# Patient Record
Sex: FEMALE | Race: WHITE | NOT HISPANIC OR LATINO | Employment: FULL TIME | URBAN - METROPOLITAN AREA
[De-identification: names, ages, dates, MRNs, and addresses within clinical notes are randomized per-mention and may not be internally consistent; named-entity substitution may affect disease eponyms.]

---

## 2017-02-14 ENCOUNTER — OFFICE VISIT - HEALTHEAST (OUTPATIENT)
Dept: MIDWIFE SERVICES | Facility: CLINIC | Age: 32
End: 2017-02-14

## 2017-02-14 ENCOUNTER — HOSPITAL ENCOUNTER (OUTPATIENT)
Dept: ULTRASOUND IMAGING | Facility: CLINIC | Age: 32
Discharge: HOME | End: 2017-02-14
Attending: ADVANCED PRACTICE MIDWIFE

## 2017-02-14 DIAGNOSIS — O20.9 VAGINAL BLEEDING IN PREGNANCY, FIRST TRIMESTER: ICD-10-CM

## 2017-02-14 DIAGNOSIS — Z32.00 POSSIBLE PREGNANCY: ICD-10-CM

## 2017-02-14 DIAGNOSIS — F41.9 ANXIETY: ICD-10-CM

## 2017-02-14 DIAGNOSIS — O46.91 ANTEPARTUM HEMORRHAGE, UNSPECIFIED, FIRST TRIMESTER (CODE): ICD-10-CM

## 2017-02-14 ASSESSMENT — MIFFLIN-ST. JEOR: SCORE: 1419.57

## 2017-03-06 ENCOUNTER — PRENATAL OFFICE VISIT - HEALTHEAST (OUTPATIENT)
Dept: MIDWIFE SERVICES | Facility: CLINIC | Age: 32
End: 2017-03-06

## 2017-03-06 DIAGNOSIS — F41.9 ANXIETY: ICD-10-CM

## 2017-03-06 DIAGNOSIS — Z34.81 ENCOUNTER FOR SUPERVISION OF OTHER NORMAL PREGNANCY IN FIRST TRIMESTER: ICD-10-CM

## 2017-03-06 DIAGNOSIS — Z34.81 ENCOUNTER FOR SUPERVISION OF OTHER NORMAL PREGNANCY, FIRST TRIMESTER: ICD-10-CM

## 2017-03-06 LAB — HIV 1+2 AB+HIV1 P24 AG SERPL QL IA: NEGATIVE

## 2017-03-06 ASSESSMENT — MIFFLIN-ST. JEOR: SCORE: 1405.96

## 2017-03-07 LAB
HBV SURFACE AG SERPL QL IA: NEGATIVE
SYPHILIS RPR SCREEN - HISTORICAL: NORMAL

## 2017-03-09 LAB
HPV INTERPRETATION - HISTORICAL: NORMAL
HPV INTERPRETER - HISTORICAL: NORMAL

## 2017-03-13 LAB
BKR LAB AP ABNORMAL BLEEDING: NO
BKR LAB AP BIRTH CONTROL/HORMONES: NORMAL
BKR LAB AP CERVICAL APPEARANCE: NORMAL
BKR LAB AP GYN ADEQUACY: NORMAL
BKR LAB AP GYN INTERPRETATION: NORMAL
BKR LAB AP HPV REFLEX: NORMAL
BKR LAB AP LMP: NORMAL
BKR LAB AP PATIENT STATUS: NORMAL
BKR LAB AP PREVIOUS ABNORMAL: NORMAL
BKR LAB AP PREVIOUS NORMAL: 2013
HIGH RISK?: NO
PATH REPORT.COMMENTS IMP SPEC: NORMAL
RESULT FLAG (HE HISTORICAL CONVERSION): NORMAL

## 2017-04-03 ENCOUNTER — PRENATAL OFFICE VISIT - HEALTHEAST (OUTPATIENT)
Dept: MIDWIFE SERVICES | Facility: CLINIC | Age: 32
End: 2017-04-03

## 2017-04-03 DIAGNOSIS — Z34.82 ENCOUNTER FOR SUPERVISION OF OTHER NORMAL PREGNANCY, SECOND TRIMESTER: ICD-10-CM

## 2017-04-03 ASSESSMENT — MIFFLIN-ST. JEOR: SCORE: 1415.03

## 2017-05-16 ENCOUNTER — HOSPITAL ENCOUNTER (OUTPATIENT)
Dept: ULTRASOUND IMAGING | Facility: CLINIC | Age: 32
Discharge: HOME | End: 2017-05-16
Attending: MIDWIFE

## 2017-05-16 ENCOUNTER — PRENATAL OFFICE VISIT - HEALTHEAST (OUTPATIENT)
Dept: MIDWIFE SERVICES | Facility: CLINIC | Age: 32
End: 2017-05-16

## 2017-05-16 ENCOUNTER — AMBULATORY - HEALTHEAST (OUTPATIENT)
Dept: MIDWIFE SERVICES | Facility: CLINIC | Age: 32
End: 2017-05-16

## 2017-05-16 DIAGNOSIS — Z34.82 ENCOUNTER FOR SUPERVISION OF OTHER NORMAL PREGNANCY, SECOND TRIMESTER: ICD-10-CM

## 2017-05-16 ASSESSMENT — MIFFLIN-ST. JEOR: SCORE: 1457.67

## 2017-05-22 ENCOUNTER — HOSPITAL ENCOUNTER (OUTPATIENT)
Dept: ULTRASOUND IMAGING | Facility: CLINIC | Age: 32
Discharge: HOME | End: 2017-05-22
Attending: ADVANCED PRACTICE MIDWIFE

## 2017-05-22 DIAGNOSIS — Z34.82 ENCOUNTER FOR SUPERVISION OF OTHER NORMAL PREGNANCY, SECOND TRIMESTER: ICD-10-CM

## 2017-05-24 ENCOUNTER — AMBULATORY - HEALTHEAST (OUTPATIENT)
Dept: OBGYN | Facility: CLINIC | Age: 32
End: 2017-05-24

## 2017-06-12 ENCOUNTER — PRENATAL OFFICE VISIT - HEALTHEAST (OUTPATIENT)
Dept: MIDWIFE SERVICES | Facility: CLINIC | Age: 32
End: 2017-06-12

## 2017-06-12 DIAGNOSIS — Z34.82 ENCOUNTER FOR SUPERVISION OF OTHER NORMAL PREGNANCY, SECOND TRIMESTER: ICD-10-CM

## 2017-06-12 ASSESSMENT — MIFFLIN-ST. JEOR: SCORE: 1490.78

## 2017-07-10 ENCOUNTER — PRENATAL OFFICE VISIT - HEALTHEAST (OUTPATIENT)
Dept: MIDWIFE SERVICES | Facility: CLINIC | Age: 32
End: 2017-07-10

## 2017-07-10 DIAGNOSIS — Z34.83 ENCOUNTER FOR SUPERVISION OF OTHER NORMAL PREGNANCY, THIRD TRIMESTER: ICD-10-CM

## 2017-07-10 ASSESSMENT — MIFFLIN-ST. JEOR: SCORE: 1528.43

## 2017-07-11 ENCOUNTER — COMMUNICATION - HEALTHEAST (OUTPATIENT)
Dept: MIDWIFE SERVICES | Facility: CLINIC | Age: 32
End: 2017-07-11

## 2017-07-11 LAB — SYPHILIS RPR SCREEN - HISTORICAL: NORMAL

## 2017-08-07 ENCOUNTER — PRENATAL OFFICE VISIT - HEALTHEAST (OUTPATIENT)
Dept: MIDWIFE SERVICES | Facility: CLINIC | Age: 32
End: 2017-08-07

## 2017-08-07 DIAGNOSIS — Z34.83 ENCOUNTER FOR SUPERVISION OF OTHER NORMAL PREGNANCY, THIRD TRIMESTER: ICD-10-CM

## 2017-08-07 ASSESSMENT — MIFFLIN-ST. JEOR: SCORE: 1555.64

## 2017-08-07 NOTE — ASSESSMENT & PLAN NOTE
Clinic/Hospital: [facility]  Partner Name: Oswaldo  Ultrasound predicts sex:female  Childrens names:   Previous labor experiences:  Level of education: college graduate  Pertinent History:   PP contraception: vasectomy  Hx PPD/Depression/Anxiety: anxiety  [facility] provider: [facility]  Plans for labor pain management:   Feeding preference: Breast  Breast pump:

## 2017-08-21 ENCOUNTER — PRENATAL OFFICE VISIT - HEALTHEAST (OUTPATIENT)
Dept: MIDWIFE SERVICES | Facility: CLINIC | Age: 32
End: 2017-08-21

## 2017-08-21 DIAGNOSIS — Z34.83 ENCOUNTER FOR SUPERVISION OF OTHER NORMAL PREGNANCY, THIRD TRIMESTER: ICD-10-CM

## 2017-08-21 ASSESSMENT — MIFFLIN-ST. JEOR: SCORE: 1578.78

## 2017-09-05 ENCOUNTER — PRENATAL OFFICE VISIT - HEALTHEAST (OUTPATIENT)
Dept: MIDWIFE SERVICES | Facility: CLINIC | Age: 32
End: 2017-09-05

## 2017-09-05 DIAGNOSIS — Z34.83 ENCOUNTER FOR SUPERVISION OF OTHER NORMAL PREGNANCY, THIRD TRIMESTER: ICD-10-CM

## 2017-09-05 ASSESSMENT — MIFFLIN-ST. JEOR: SCORE: 1591.93

## 2017-09-11 ENCOUNTER — PRENATAL OFFICE VISIT - HEALTHEAST (OUTPATIENT)
Dept: MIDWIFE SERVICES | Facility: CLINIC | Age: 32
End: 2017-09-11

## 2017-09-11 DIAGNOSIS — Z34.83 ENCOUNTER FOR SUPERVISION OF OTHER NORMAL PREGNANCY, THIRD TRIMESTER: ICD-10-CM

## 2017-09-11 ASSESSMENT — MIFFLIN-ST. JEOR: SCORE: 1601

## 2017-09-18 ENCOUNTER — PRENATAL OFFICE VISIT - HEALTHEAST (OUTPATIENT)
Dept: MIDWIFE SERVICES | Facility: CLINIC | Age: 32
End: 2017-09-18

## 2017-09-18 DIAGNOSIS — Z34.83 ENCOUNTER FOR SUPERVISION OF OTHER NORMAL PREGNANCY, THIRD TRIMESTER: ICD-10-CM

## 2017-09-18 ASSESSMENT — MIFFLIN-ST. JEOR: SCORE: 1591.93

## 2017-09-26 ENCOUNTER — COMMUNICATION - HEALTHEAST (OUTPATIENT)
Dept: OBGYN | Facility: CLINIC | Age: 32
End: 2017-09-26

## 2017-09-26 ENCOUNTER — PRENATAL OFFICE VISIT - HEALTHEAST (OUTPATIENT)
Dept: MIDWIFE SERVICES | Facility: CLINIC | Age: 32
End: 2017-09-26

## 2017-09-26 DIAGNOSIS — Z34.83 ENCOUNTER FOR SUPERVISION OF OTHER NORMAL PREGNANCY, THIRD TRIMESTER: ICD-10-CM

## 2017-09-26 ASSESSMENT — MIFFLIN-ST. JEOR: SCORE: 1602.15

## 2017-10-01 ENCOUNTER — ANESTHESIA - HEALTHEAST (OUTPATIENT)
Dept: OBGYN | Facility: HOSPITAL | Age: 32
End: 2017-10-01

## 2017-10-01 ENCOUNTER — COMMUNICATION - HEALTHEAST (OUTPATIENT)
Dept: OBGYN | Facility: CLINIC | Age: 32
End: 2017-10-01

## 2017-10-03 ENCOUNTER — HOME CARE/HOSPICE - HEALTHEAST (OUTPATIENT)
Dept: HOME HEALTH SERVICES | Facility: HOME HEALTH | Age: 32
End: 2017-10-03

## 2017-10-05 ENCOUNTER — HOME CARE/HOSPICE - HEALTHEAST (OUTPATIENT)
Dept: HOME HEALTH SERVICES | Facility: HOME HEALTH | Age: 32
End: 2017-10-05

## 2017-11-13 ENCOUNTER — OFFICE VISIT - HEALTHEAST (OUTPATIENT)
Dept: MIDWIFE SERVICES | Facility: CLINIC | Age: 32
End: 2017-11-13

## 2017-11-13 ASSESSMENT — MIFFLIN-ST. JEOR: SCORE: 1543.18

## 2018-12-19 ENCOUNTER — RECORDS - HEALTHEAST (OUTPATIENT)
Dept: LAB | Facility: CLINIC | Age: 33
End: 2018-12-19

## 2018-12-19 LAB — TSH SERPL DL<=0.005 MIU/L-ACNC: 1.7 UIU/ML (ref 0.3–5)

## 2019-12-06 ENCOUNTER — RECORDS - HEALTHEAST (OUTPATIENT)
Dept: LAB | Facility: CLINIC | Age: 34
End: 2019-12-06

## 2019-12-06 LAB
ALBUMIN SERPL-MCNC: 4.3 G/DL (ref 3.5–5)
ALP SERPL-CCNC: 49 U/L (ref 45–120)
ALT SERPL W P-5'-P-CCNC: <9 U/L (ref 0–45)
ANION GAP SERPL CALCULATED.3IONS-SCNC: 11 MMOL/L (ref 5–18)
AST SERPL W P-5'-P-CCNC: 13 U/L (ref 0–40)
BILIRUB SERPL-MCNC: 0.3 MG/DL (ref 0–1)
BUN SERPL-MCNC: 17 MG/DL (ref 8–22)
CALCIUM SERPL-MCNC: 9.5 MG/DL (ref 8.5–10.5)
CHLORIDE BLD-SCNC: 106 MMOL/L (ref 98–107)
CO2 SERPL-SCNC: 24 MMOL/L (ref 22–31)
CREAT SERPL-MCNC: 0.8 MG/DL (ref 0.6–1.1)
GFR SERPL CREATININE-BSD FRML MDRD: >60 ML/MIN/1.73M2
GLUCOSE BLD-MCNC: 94 MG/DL (ref 70–125)
POTASSIUM BLD-SCNC: 4.3 MMOL/L (ref 3.5–5)
PROT SERPL-MCNC: 7.1 G/DL (ref 6–8)
SODIUM SERPL-SCNC: 141 MMOL/L (ref 136–145)
TSH SERPL DL<=0.005 MIU/L-ACNC: 1.94 UIU/ML (ref 0.3–5)

## 2020-09-22 ENCOUNTER — RECORDS - HEALTHEAST (OUTPATIENT)
Dept: LAB | Facility: CLINIC | Age: 35
End: 2020-09-22

## 2020-09-23 LAB — BACTERIA SPEC CULT: NO GROWTH

## 2021-05-30 ENCOUNTER — RECORDS - HEALTHEAST (OUTPATIENT)
Dept: ADMINISTRATIVE | Facility: CLINIC | Age: 36
End: 2021-05-30

## 2021-05-30 VITALS — BODY MASS INDEX: 25.55 KG/M2 | WEIGHT: 159 LBS | HEIGHT: 66 IN

## 2021-05-30 VITALS — WEIGHT: 157 LBS | BODY MASS INDEX: 25.23 KG/M2 | HEIGHT: 66 IN

## 2021-05-30 VITALS — HEIGHT: 66 IN | BODY MASS INDEX: 25.71 KG/M2 | WEIGHT: 160 LBS

## 2021-05-31 VITALS — BODY MASS INDEX: 30.53 KG/M2 | WEIGHT: 190 LBS | HEIGHT: 66 IN

## 2021-05-31 VITALS — HEIGHT: 65 IN | BODY MASS INDEX: 31.49 KG/M2 | WEIGHT: 189 LBS

## 2021-05-31 VITALS — WEIGHT: 195.1 LBS | BODY MASS INDEX: 31.36 KG/M2 | HEIGHT: 66 IN

## 2021-05-31 VITALS — WEIGHT: 175.7 LBS | BODY MASS INDEX: 28.24 KG/M2 | HEIGHT: 66 IN

## 2021-05-31 VITALS — BODY MASS INDEX: 32.14 KG/M2 | WEIGHT: 200 LBS | HEIGHT: 66 IN

## 2021-05-31 VITALS — HEIGHT: 66 IN | WEIGHT: 184 LBS | BODY MASS INDEX: 29.57 KG/M2

## 2021-05-31 VITALS — WEIGHT: 198 LBS | BODY MASS INDEX: 31.82 KG/M2 | HEIGHT: 66 IN

## 2021-05-31 VITALS — HEIGHT: 66 IN | BODY MASS INDEX: 31.82 KG/M2 | WEIGHT: 198 LBS

## 2021-05-31 VITALS — HEIGHT: 65 IN | BODY MASS INDEX: 33.66 KG/M2 | WEIGHT: 202 LBS

## 2021-05-31 VITALS — HEIGHT: 66 IN | WEIGHT: 168.4 LBS | BODY MASS INDEX: 27.06 KG/M2

## 2021-06-08 NOTE — PROGRESS NOTES
"  Assessment:     , LMP 2016     Plan:   1. Discussed working Estimated Date of Delivery. Spotting on 2017. Will get US to confirm viability and dating. Referral given.   2. Oriented to [facility] CNM care; group and practice info reviewed. Takes Sertraline 50 mg PO QD for anxiety: has white coat syndrome.   3. 1st trimester teaching: encouraged well-balanced diet, pre- vitamins, vitamin D3 and omega 3 supplements, daily and walking for exercise. Discussed warning signs and when to call.   4. HML ABO/RH typing today.  5. She will schedule her initial OB visit in 3-4 weeks.   6. Does not want fetal screening or testing beyond the mid-pregnancy fetal USN scan.     Total time spent with patient 20 minutes, >50% counseling, education and coordination of care.   Subjective:     Christine is a 31 y.o. y.o.  who presents for pregnancy confirmation. Works FT in marketing. Has a 2.4 y/o boy, Estuardo. Expected pregnancy; became pregnant after three months of being open to conception.  Contraception: none; prior ParaGard, removed 2016.      Current symptoms also include: fatigued; breast tenderness; nausea and vomiting (1-4X/day)--advised at length.     Patient's last menstrual period was 2016.. She is certain of this date. Menstrual cycles are relatively regular, occuring every 28-32 days.  Last period was normal.      Review of Systems  Denies bleeding, pain or cramping, abnormal vaginal discharge or dysuria.  Objective:        Visit Vitals     /74 (Patient Site: Left Arm, Patient Position: Sitting, Cuff Size: Adult Regular)     Pulse 88     Resp 16     Ht 5' 5.5\" (1.664 m)     Wt 160 lb (72.6 kg)     LMP 2016     Breastfeeding No     BMI 26.22 kg/m2      General: alert and no acute distress      Lab Review  Urine HCG: positive    "

## 2021-06-09 NOTE — PROGRESS NOTES
Christine is here today for routine prenatal visit. Feeling better for the last few weeks, happy that she has gained 2 # back after being sick and losing weight. Denies feeling quickening yet. Plan for screening ultrasound at 20 weeks. Referral placed on chart. Declines quad screening.

## 2021-06-09 NOTE — PROGRESS NOTES
PRENATAL VISIT   FIRST OBSTETRICAL EXAM - OB    Assessment / Impression     First prenatal visit at 10w1d      Plan:     -IOB labs drawn.  -Pt is not interested in drawing lead level.  -Patient is not interested in waterbirth.   -Reviewed prenatal care schedule.  -Optimal nutrition and weight gain discussed. Pregnancy weight gain of 25-35 lbs (BMI 18.5-24.9) encouraged.   -Anticipatory guidance for common pregnancy questions and concerns reviewed.   -Danger s/sx for this trimester reviewed with patient.  -Reviewed genetic screening options with patient, patient does not elects for first trimester screening.  -IOB packet given and reviewed with patient.  -CNM services and hospital options reviewed; emergency and scheduling phone numbers given to patient.  -Return to clinic 4 weeks    Total time spent with patient: 40 minutes, >50% time spent counseling and coordinating care.    Subjective:    Christine Stokes is a 31 y.o.  here today for her First Obstetrical Exam.      Education level: college graduate   Occupation: marketing  Partners name: [employer] occupation: [employer]    OB History    Para Term  AB SAB TAB Ectopic Multiple Living   2 1 1       1      # Outcome Date GA Lbr Raymundo/2nd Weight Sex Delivery Anes PTL Lv   2 Current            1 Term 14 39w1d 02:53 / 00:51 7 lb 12 oz (3.515 kg) M Vag-Spont EPI,Local N Y          Expected Date of Delivery: 10/1/2017, by Last Menstrual Period    No past medical history on file.  No past surgical history on file.  Social History   Substance Use Topics     Smoking status: Never Smoker     Smokeless tobacco: None     Alcohol use No     Current Outpatient Prescriptions   Medication Sig Dispense Refill     sertraline (ZOLOFT) 50 MG tablet Take 50 mg by mouth daily.       No current facility-administered medications for this visit.      Allergies   Allergen Reactions     Ciprofloxacin              Pregnancy Risk Factors: Every been  "treated for an emotional disturbance    EPDS score today: 4    Review of Systems  General:  Denies problem  Eyes: Denies problem  Ears/Nose/Throat: Denies problem  Cardiovascular: Denies problem  Respiratory:  Denies problem  Gastrointestinal: nausea and vomiting, getting less frequent  Genitourinary: Denies problem  Musculoskeletal:  Breast tenderness  Skin: Denies problem  Neurologic: Denies problem  Psychiatric: Denies problem  Endocrine: Denies problem  Heme/Lymphatic: Denies problem   Allergic/Immunologic: Denies problem       Objective:   Objective    Vitals:    03/06/17 0756   BP: 122/64   Pulse: 64   Weight: 157 lb (71.2 kg)   Height: 5' 5.5\" (1.664 m)     Physical Exam:  General Appearance: Alert, cooperative, no distress, appears stated age  Head: Normocephalic, without obvious abnormality, atraumatic  Eyes: Conjunctiva/corneas clear, does not wear corrective lenses  Neck: Supple, symmetrical, trachea midline, no adenopathy  Thyroid: not enlarged, symmetric, no tenderness/mass/nodules  Back: Symmetric, no curvature, ROM normal, no CVA tenderness  Lungs: Clear to auscultation bilaterally, respirations unlabored  Heart: Regular rate and rhythm, S1 and S2 normal, no murmur, rub, or gallop  Breasts: No breast masses, tenderness, asymmetry, or nipple discharge. Nipples are  everted.  Abdomen: Soft, non-tender, bowel sounds active all four quadrants, no masses.   FHT: 174   Vulva:  no sign of lesions or condyloma, normal hair distribution  Vagina: pink, normal rugae, no abnormal discharge  Cervix:  long/thick/closed, no lesions or inflammation noted, negative CMT with exam  Uterus: mid position, non tender, enlarged approximately 10 weeks size  Adnexa:  no masses appreciated, non-tender with palpation  Pelvic spines:AVERAGE  Sacrum:CONCAVE  Suprapubic Arch:NORMAL  Pelvis type:gynecoid            Extremities: Extremities normal, atraumatic, no cyanosis or edema  Skin: Skin color, texture, turgor normal, no rashes " or lesions  Lymph nodes: Cervical, supraclavicular, and axillary nodes normal  Neurologic: Alert and oriented x 3.    Lab:   Results for orders placed or performed in visit on 02/14/17   Pregnancy, Urine   Result Value Ref Range    Pregnancy Test, Urine Positive    HML ABO/Rh Typing (Type and Screen for outpatient)   Result Value Ref Range    HML ABO/Rh Typing O POS     HML ABO/Rh Repeat Typing O POS

## 2021-06-10 NOTE — PROGRESS NOTES
"Christine Stokes is here for JULIO at 20w2d. \"Feeling really good\". Hungrier than normal. Has been exercising. Weight gain improved. Feeling movement now since late last week. Had fetal survey this morning: having a girl!. Reviewed report with patient: normal growth, posterior placenta, normal fluid, small 1 cm fibroid. Normal anatomy except heart was not visualized well; f/u US ordered for 1-2 weeks. RTC 4-5 weeks.  "

## 2021-06-11 NOTE — PROGRESS NOTES
Christine is here today by herself for her routine prenatal visit. Reviewed S/S of PTL, pt has some irreg contraction but reports that they are better with rest and emptying bladder.  One hour glucose, Hgb, RPR drawn today. Tdap given. Reviewed peds provider, plan to breast feed and enc to pre register at hospital after 30 weeks.

## 2021-06-12 NOTE — PROGRESS NOTES
Christine is here today for routine prenatal visit at 32 weeks, no concerns. Reviewed lab results, discussed labor and signs and symptoms of PTL. Encouraged to pre register at hospital. Talking with  about permanent birth control, possibly vasectomy.

## 2021-06-12 NOTE — PROGRESS NOTES
Christine Stokes is here for JULIO 36w2d. Here alone. Feeling well overall. Denies concerns today. Active FM. Feels like baby may have dropped a bit. Measuring 34 cm today, may be due to baby being lower in pelvis. Advised we would watch FH at next visit. Vertex/ROT per Leopolds and VE. GBS and Hgb collected today. RTC 1 week.

## 2021-06-12 NOTE — PROGRESS NOTES
Christine is here today for her routine prenatal exam at 34 weeks. EPDS score=1. Stable on Sertraline 50 mg po daily for anxiety. 5# weight gain in last two weeks, has been on vacation and thinks change in diet may have something to do with it, also feels a bit more swollen in hands and feet. Baby active. Unsure of presentation today. Discussed confirming presentation next visit and possible ultrasound to confirm if necessary. IUD postpartum for BC until  decides about vasectomy.

## 2021-06-12 NOTE — PROGRESS NOTES
Christine Stokes is here by herself for a routine prenatal visit at 37w1d.  She has no concerns and is feeling well.    Feeling a little tired but doing okay  Fetal movement is normal.   Interval weight gain is approriate.   Group B strep was negative.  Encouraged to schedule weekly visits from to/through her EDB.  Anticipatory guidance, signs of symptoms of labor or SROM, third trimester warning signs, when to call and CNM contact information reviewed.   RTC 1 week    LISSETTE Gomes CNM

## 2021-06-13 NOTE — ANESTHESIA PREPROCEDURE EVALUATION
Anesthesia Evaluation      Patient summary reviewed   No history of anesthetic complications     Airway   Mallampati: II  Neck ROM: full   Pulmonary - negative ROS and normal exam                          Cardiovascular - normal exam  Exercise tolerance: > or = 4 METS   Neuro/Psych - negative ROS     Endo/Other    (+) pregnant     GI/Hepatic/Renal - negative ROS           Dental - normal exam                        Anesthesia Plan  Planned anesthetic: epidural    ASA 2     Anesthetic plan and risks discussed with: patient    Post-op plan: routine recovery

## 2021-06-13 NOTE — ANESTHESIA POSTPROCEDURE EVALUATION
Anesthesiology Labor Epidural Rounds    Patient doing well.  Denies headache.  Denies back pain.  Patient able to move all extremities.  Vital signs stable.  Site clean, dry and intact.    Patient satisfied with labor analgesia.  No complications.      Yasir Blankenship M.D.  Department of Anesthesiology

## 2021-06-13 NOTE — PROGRESS NOTES
Christine is here for routine PNV.  Doing well - active baby.  Notices increased vaginal discharge 1x/day, but no LOF or gushes.    Denies ctx.    A little nervous about spontaneous labor since she was induced with her last baby.   Desires VE:  Internal os closed, external os 2cm/50/-2/med/posterior and to maternal right.    Tchg:  FKC, s/sx of labor, CNM number and when to call

## 2021-06-13 NOTE — ANESTHESIA PROCEDURE NOTES
Epidural Block    Patient location during procedure: OB  Time Called: 10/1/2017 10:07 AM  Reason for Block:labor epidural  Staffing:  Performing  Anesthesiologist: NANA HILTON  Preanesthetic Checklist  Completed: patient identified, risks, benefits, and alternatives discussed, timeout performed, consent obtained, airway assessed, oxygen available, suction available, emergency drugs available and hand hygiene performed  Procedure  Patient position: right lateral decubitus  Prep: ChloraPrep  Patient monitoring: continuous pulse oximetry, heart rate and blood pressure  Approach: midline  Location: L3-L4  Epidural technique: DEE to local.  Number of Attempts:4  Needle  Needle type: Gumaro   Needle gauge: 18 G     Catheter in Space: 4  Sensory level: not assessed at this time.     Additional Notes:  Hot bone multiple times.

## 2021-06-14 NOTE — PROGRESS NOTES
"  Assessment:        Christine Stokes is a 32 y.o. female here for postpartum exam at 6 weeks postpartum. Pap smear not done at today's visit.     Plan:        1. Normal postpartum  2. Contraception: IUD plans to return to clinic for insertion  3. RTC for IUD insertion      Subjective:       Christine Stokes is a 32 y.o. female who presents for a postpartum visit. She is 6 weeks postpartum following a spontaneous vaginal delivery. I have fully reviewed the prenatal and intrapartum course. The delivery was at 40 gestational weeks. Outcome: spontaneous vaginal delivery. Postpartum course has been uncomplicated. Baby's course has been uncomplicated. Baby is feeding breast for the first 5 week now being fed by bottle - Similac Advance. Bled for  5 weeks postpartum.  Currently bleeding  no bleeding. Bowel function is normal. Bladder function is normal. Patient has not resumed intercourse. Contraception method is IUD. Plans to return to clinic for insertion. Reviewed risks and benefits, has had Para sanya IUD previously and is comfortable with this method.  Postpartum depression screening score: 0     The following portions of the patient's history were reviewed and updated as appropriate: allergies, current medications, problem list and past family history    Review of Systems  General:  Denies problem  Eyes: Denies problem  Ears/Nose/Throat: Denies problem  Cardiovascular: Denies problem  Respiratory:  Denies problem  Gastrointestinal:  Denies problem, Genitourinary: Denies problem  Musculoskeletal:  Denies problem  Skin: Denies problem  Neurologic: Denies problem  Psychiatric: Denies problem  Endocrine: Denies problem  Heme/Lymphatic: Denies problem   Allergic/Immunologic: Denies problem          Objective:         Vitals:    11/13/17 0838   BP: 104/72   Pulse: 76   Weight: 189 lb (85.7 kg)   Height: 5' 5\" (1.651 m)       Physical Exam:  General Appearance: Alert, cooperative, no distress, appears stated age  Head: " Normocephalic, without obvious abnormality, atraumatic  Neck: Supple, symmetrical, trachea midline, no adenopathy;  thyroid: not enlarged, symmetric, no tenderness/mass/nodules; no carotid bruit or JVD  Back: Symmetric, no curvature, ROM normal, no CVA tenderness  Lungs: Clear to auscultation bilaterally, respirations unlabored  Breasts: No breast exam today, weaned from pumping and feeding breast milk via bottle last week changed to formula feeding.   Heart: Regular rate and rhythm, S1 and S2 normal, no murmur, rub, or gallop, Abdomen: Soft, non-tender, bowel sounds active all four quadrants,  no masses, no organomegaly  Pelvic:bimanual exam reveals external genitalia WNL, normal hair distribution, cervix, nontender, mobile, uterus is small nontender, mobile no adnexal masses left or right.  Extremities: Extremities normal, atraumatic, no cyanosis or edema  Skin: Skin color, texture, turgor normal, no rashes or lesions

## 2021-06-15 PROBLEM — Z34.80 ENCOUNTER FOR SUPERVISION OF OTHER NORMAL PREGNANCY: Status: ACTIVE | Noted: 2017-03-06

## 2021-06-15 PROBLEM — D25.9 UTERINE FIBROID: Status: ACTIVE | Noted: 2017-05-24

## 2021-06-15 PROBLEM — F41.9 ANXIETY: Status: ACTIVE | Noted: 2017-02-14

## 2021-07-14 PROBLEM — Z34.90 PREGNANT: Status: RESOLVED | Noted: 2017-10-01 | Resolved: 2017-10-02

## 2021-07-14 PROBLEM — O20.9 VAGINAL BLEEDING IN PREGNANCY, FIRST TRIMESTER: Status: RESOLVED | Noted: 2017-02-14 | Resolved: 2017-04-03

## 2021-08-22 ENCOUNTER — HEALTH MAINTENANCE LETTER (OUTPATIENT)
Age: 36
End: 2021-08-22

## 2021-10-17 ENCOUNTER — HEALTH MAINTENANCE LETTER (OUTPATIENT)
Age: 36
End: 2021-10-17

## 2022-04-06 ENCOUNTER — LAB REQUISITION (OUTPATIENT)
Dept: LAB | Facility: CLINIC | Age: 37
End: 2022-04-06

## 2022-04-06 DIAGNOSIS — Z83.49 FAMILY HISTORY OF OTHER ENDOCRINE, NUTRITIONAL AND METABOLIC DISEASES: ICD-10-CM

## 2022-04-06 DIAGNOSIS — N92.6 IRREGULAR MENSTRUATION, UNSPECIFIED: ICD-10-CM

## 2022-04-06 PROCEDURE — 83001 ASSAY OF GONADOTROPIN (FSH): CPT | Performed by: FAMILY MEDICINE

## 2022-04-06 PROCEDURE — 84443 ASSAY THYROID STIM HORMONE: CPT | Performed by: FAMILY MEDICINE

## 2022-04-07 LAB
FSH SERPL-ACNC: 7.2 MIU/ML
TSH SERPL DL<=0.005 MIU/L-ACNC: 1.73 UIU/ML (ref 0.3–5)

## 2022-04-29 NOTE — PROGRESS NOTES
Feeling well.  Active baby.  Is starting to notice some rare BH ctx, nothing regular.  Reviewed US findings.  Disc GCT at next visit.  Pt reports that she failed GCT during first pregnancy but passed GTT.   continue home atorvastatin, zetia

## 2022-10-02 ENCOUNTER — HEALTH MAINTENANCE LETTER (OUTPATIENT)
Age: 37
End: 2022-10-02

## 2023-07-02 ENCOUNTER — APPOINTMENT (OUTPATIENT)
Dept: CT IMAGING | Facility: HOSPITAL | Age: 38
End: 2023-07-02
Attending: STUDENT IN AN ORGANIZED HEALTH CARE EDUCATION/TRAINING PROGRAM
Payer: COMMERCIAL

## 2023-07-02 ENCOUNTER — HOSPITAL ENCOUNTER (EMERGENCY)
Facility: HOSPITAL | Age: 38
Discharge: HOME | End: 2023-07-02
Attending: STUDENT IN AN ORGANIZED HEALTH CARE EDUCATION/TRAINING PROGRAM | Admitting: STUDENT IN AN ORGANIZED HEALTH CARE EDUCATION/TRAINING PROGRAM
Payer: COMMERCIAL

## 2023-07-02 VITALS
TEMPERATURE: 98.1 F | WEIGHT: 143 LBS | SYSTOLIC BLOOD PRESSURE: 121 MMHG | HEART RATE: 68 BPM | BODY MASS INDEX: 22.98 KG/M2 | HEIGHT: 66 IN | DIASTOLIC BLOOD PRESSURE: 63 MMHG | RESPIRATION RATE: 18 BRPM | OXYGEN SATURATION: 100 %

## 2023-07-02 DIAGNOSIS — B27.90 INFECTIOUS MONONUCLEOSIS WITHOUT COMPLICATION, INFECTIOUS MONONUCLEOSIS DUE TO UNSPECIFIED ORGANISM: ICD-10-CM

## 2023-07-02 DIAGNOSIS — R00.2 PALPITATIONS: ICD-10-CM

## 2023-07-02 LAB
ALBUMIN SERPL BCG-MCNC: 4 G/DL (ref 3.5–5.2)
ALBUMIN UR-MCNC: 20 MG/DL
ALP SERPL-CCNC: 79 U/L (ref 35–104)
ALT SERPL W P-5'-P-CCNC: 26 U/L (ref 0–50)
ANION GAP SERPL CALCULATED.3IONS-SCNC: 13 MMOL/L (ref 7–15)
APPEARANCE UR: CLEAR
AST SERPL W P-5'-P-CCNC: 35 U/L (ref 0–45)
BASOPHILS # BLD AUTO: 0 10E3/UL (ref 0–0.2)
BASOPHILS NFR BLD AUTO: 1 %
BILIRUB SERPL-MCNC: 0.4 MG/DL
BILIRUB UR QL STRIP: NEGATIVE
BUN SERPL-MCNC: 19 MG/DL (ref 6–20)
CALCIUM SERPL-MCNC: 9.4 MG/DL (ref 8.6–10)
CHLORIDE SERPL-SCNC: 104 MMOL/L (ref 98–107)
COLOR UR AUTO: YELLOW
CREAT SERPL-MCNC: 0.8 MG/DL (ref 0.51–0.95)
D DIMER PPP FEU-MCNC: 2.56 UG/ML FEU (ref 0–0.5)
DEPRECATED HCO3 PLAS-SCNC: 23 MMOL/L (ref 22–29)
EOSINOPHIL # BLD AUTO: 0.1 10E3/UL (ref 0–0.7)
EOSINOPHIL NFR BLD AUTO: 2 %
ERYTHROCYTE [DISTWIDTH] IN BLOOD BY AUTOMATED COUNT: 13.1 % (ref 10–15)
FLUAV RNA SPEC QL NAA+PROBE: NEGATIVE
FLUBV RNA RESP QL NAA+PROBE: NEGATIVE
GFR SERPL CREATININE-BSD FRML MDRD: >90 ML/MIN/1.73M2
GLUCOSE SERPL-MCNC: 119 MG/DL (ref 70–99)
GLUCOSE UR STRIP-MCNC: NEGATIVE MG/DL
HCG SERPL QL: NEGATIVE
HCT VFR BLD AUTO: 38.6 % (ref 35–47)
HGB BLD-MCNC: 12.6 G/DL (ref 11.7–15.7)
HGB UR QL STRIP: ABNORMAL
IMM GRANULOCYTES # BLD: 0 10E3/UL
IMM GRANULOCYTES NFR BLD: 0 %
KETONES UR STRIP-MCNC: NEGATIVE MG/DL
LEUKOCYTE ESTERASE UR QL STRIP: NEGATIVE
LYMPHOCYTES # BLD AUTO: 1.2 10E3/UL (ref 0.8–5.3)
LYMPHOCYTES NFR BLD AUTO: 34 %
MCH RBC QN AUTO: 26.6 PG (ref 26.5–33)
MCHC RBC AUTO-ENTMCNC: 32.6 G/DL (ref 31.5–36.5)
MCV RBC AUTO: 82 FL (ref 78–100)
MONOCYTES # BLD AUTO: 0.3 10E3/UL (ref 0–1.3)
MONOCYTES NFR BLD AUTO: 8 %
MONOCYTES NFR BLD AUTO: POSITIVE %
MUCOUS THREADS #/AREA URNS LPF: PRESENT /LPF
NEUTROPHILS # BLD AUTO: 1.9 10E3/UL (ref 1.6–8.3)
NEUTROPHILS NFR BLD AUTO: 55 %
NITRATE UR QL: NEGATIVE
NRBC # BLD AUTO: 0 10E3/UL
NRBC BLD AUTO-RTO: 0 /100
PH UR STRIP: 5.5 [PH] (ref 5–7)
PLATELET # BLD AUTO: 162 10E3/UL (ref 150–450)
POTASSIUM SERPL-SCNC: 3.8 MMOL/L (ref 3.4–5.3)
PROT SERPL-MCNC: 7 G/DL (ref 6.4–8.3)
RBC # BLD AUTO: 4.73 10E6/UL (ref 3.8–5.2)
RBC URINE: 36 /HPF
RSV RNA SPEC NAA+PROBE: NEGATIVE
SARS-COV-2 RNA RESP QL NAA+PROBE: NEGATIVE
SODIUM SERPL-SCNC: 140 MMOL/L (ref 136–145)
SP GR UR STRIP: 1.03 (ref 1–1.03)
SQUAMOUS EPITHELIAL: <1 /HPF
TROPONIN T SERPL HS-MCNC: <6 NG/L
UROBILINOGEN UR STRIP-MCNC: <2 MG/DL
WBC # BLD AUTO: 3.4 10E3/UL (ref 4–11)
WBC URINE: 1 /HPF

## 2023-07-02 PROCEDURE — 36415 COLL VENOUS BLD VENIPUNCTURE: CPT | Performed by: STUDENT IN AN ORGANIZED HEALTH CARE EDUCATION/TRAINING PROGRAM

## 2023-07-02 PROCEDURE — 84484 ASSAY OF TROPONIN QUANT: CPT | Performed by: STUDENT IN AN ORGANIZED HEALTH CARE EDUCATION/TRAINING PROGRAM

## 2023-07-02 PROCEDURE — 93005 ELECTROCARDIOGRAM TRACING: CPT | Performed by: STUDENT IN AN ORGANIZED HEALTH CARE EDUCATION/TRAINING PROGRAM

## 2023-07-02 PROCEDURE — 86308 HETEROPHILE ANTIBODY SCREEN: CPT | Performed by: STUDENT IN AN ORGANIZED HEALTH CARE EDUCATION/TRAINING PROGRAM

## 2023-07-02 PROCEDURE — 81001 URINALYSIS AUTO W/SCOPE: CPT | Performed by: STUDENT IN AN ORGANIZED HEALTH CARE EDUCATION/TRAINING PROGRAM

## 2023-07-02 PROCEDURE — 250N000011 HC RX IP 250 OP 636: Mod: JZ | Performed by: STUDENT IN AN ORGANIZED HEALTH CARE EDUCATION/TRAINING PROGRAM

## 2023-07-02 PROCEDURE — 99285 EMERGENCY DEPT VISIT HI MDM: CPT | Mod: 25

## 2023-07-02 PROCEDURE — 96360 HYDRATION IV INFUSION INIT: CPT | Mod: 59

## 2023-07-02 PROCEDURE — 80053 COMPREHEN METABOLIC PANEL: CPT | Performed by: STUDENT IN AN ORGANIZED HEALTH CARE EDUCATION/TRAINING PROGRAM

## 2023-07-02 PROCEDURE — 258N000003 HC RX IP 258 OP 636: Performed by: STUDENT IN AN ORGANIZED HEALTH CARE EDUCATION/TRAINING PROGRAM

## 2023-07-02 PROCEDURE — 84703 CHORIONIC GONADOTROPIN ASSAY: CPT | Performed by: STUDENT IN AN ORGANIZED HEALTH CARE EDUCATION/TRAINING PROGRAM

## 2023-07-02 PROCEDURE — 85379 FIBRIN DEGRADATION QUANT: CPT | Performed by: STUDENT IN AN ORGANIZED HEALTH CARE EDUCATION/TRAINING PROGRAM

## 2023-07-02 PROCEDURE — 86618 LYME DISEASE ANTIBODY: CPT | Performed by: STUDENT IN AN ORGANIZED HEALTH CARE EDUCATION/TRAINING PROGRAM

## 2023-07-02 PROCEDURE — 86617 LYME DISEASE ANTIBODY: CPT | Performed by: STUDENT IN AN ORGANIZED HEALTH CARE EDUCATION/TRAINING PROGRAM

## 2023-07-02 PROCEDURE — 85025 COMPLETE CBC W/AUTO DIFF WBC: CPT | Performed by: STUDENT IN AN ORGANIZED HEALTH CARE EDUCATION/TRAINING PROGRAM

## 2023-07-02 PROCEDURE — 87637 SARSCOV2&INF A&B&RSV AMP PRB: CPT | Performed by: STUDENT IN AN ORGANIZED HEALTH CARE EDUCATION/TRAINING PROGRAM

## 2023-07-02 PROCEDURE — 71275 CT ANGIOGRAPHY CHEST: CPT

## 2023-07-02 RX ORDER — IOPAMIDOL 755 MG/ML
90 INJECTION, SOLUTION INTRAVASCULAR ONCE
Status: COMPLETED | OUTPATIENT
Start: 2023-07-02 | End: 2023-07-02

## 2023-07-02 RX ADMIN — IOPAMIDOL 90 ML: 755 INJECTION, SOLUTION INTRAVENOUS at 17:42

## 2023-07-02 RX ADMIN — SODIUM CHLORIDE 1000 ML: 9 INJECTION, SOLUTION INTRAVENOUS at 16:53

## 2023-07-02 ASSESSMENT — ACTIVITIES OF DAILY LIVING (ADL): ADLS_ACUITY_SCORE: 35

## 2023-07-02 NOTE — ED PROVIDER NOTES
NAME: Christine Stokes  AGE: 38 year old female  YOB: 1985  MRN: [id number]  EVALUATION DATE & TIME: 2023  4:08 PM    PCP: Lyndsay Nesbitt  ED PROVIDER: Lyndsay Alvarez MD.    Chief Complaint   Patient presents with     Palpitations       FINAL IMPRESSION:  1. Palpitations    2. Infectious mononucleosis without complication, infectious mononucleosis due to unspecified organism        MEDICAL DECISION MAKIN:24 PM I met with the patient, obtained history, performed an initial exam, and discussed options and plan for diagnostics and treatment here in the ED.  6:31 PM I spoke to patient regarding the plan for discharge. They are agreeable and comfortable with plan.    38-year-old female presents with palpitations.  Patient's been feeling unwell for the last 10 days or so.  She initially had fevers that have since improved.  But she has had increased fatigue and felt like she has had some right-sided neck lymphadenopathy.  Today at the [employer] [place] game she developed some palpitations and left-sided chest pressure.  She is afebrile here with generally reassuring vitals, do not suspect sepsis. No AMS or nuchal rigidity, do not suspect meningitis. Her EKG shows sinus rhythm without ST elevation.  High-sensitivity troponin <6, low heart score, do not suspect ACS or feel needs delta troponin. D-dimer was elevated to proceeded with CT PE scan, no PE seen. Nature of pain not suggestive of pericarditis or dissection. UA with blood (currently menstruating) but no signs of infection. No sore throat and reassuring throat exam.  I do suspect her symptoms could be from mono. Radiologist did note mild splenomegaly on her CT scan that likely from mono.  She did feel improved after fluids and heart rate decreased to 68.  I recommended continual symptomatic management at home and close follow-up with her primary care doctor.  Strict return precautions discussed and her questions were answered.  Of note she had  "requested a Lyme's test which was pending at the time of discharge.    Medical Decision Making    History:    Supplemental history from: N/A    External Record(s) reviewed: Documented in chart, if applicable.    Work Up:    Chart documentation includes differential considered and any EKGs or imaging interpreted by provider.    In additional to work up documented, I considered the following work up: Documented in chart, if applicable.    External consultation:    Discussion of management with another provider: Documented in chart, if applicable    Complicating factors:    Care impacted by chronic illness: Mental Health    Care affected by social determinants of health: Access to Medical Care    Disposition considerations: Discharge. No recommendations on prescription strength medication(s). See documentation for any additional details.      MEDICATIONS GIVEN IN THE EMERGENCY:  Medications   0.9% sodium chloride BOLUS (0 mLs Intravenous Stopped 7/2/23 1822)   iopamidol (ISOVUE-370) solution 90 mL (90 mLs Intravenous $Given 7/2/23 1742)       NEW PRESCRIPTIONS STARTED AT TODAY'S ER VISIT:  Discharge Medication List as of 7/2/2023  6:46 PM          =================================================================  HPI    Patient information was obtained from: Patient  Use of : N/A      Christine Stokes is a 38 year old female with a past medical history of anxiety, who presents to the ED by private car for evaluation of heart palpitations.     Patient reports an acute onset of \"pounding\" heart palitations while at the [employer] game earlier today. She also complains of some lightheadedness with this, however, she notes a chest tightness that has been present \"all day.\" Per her phone jolynn, her heart rate reached up to 120s BPM while at rest, but states it's typically between 45-52 BPM as she is a runner. Decrease in activity secondary to symptoms as well.     Of note, patient reports that she has been battling " "this ongoing \"bug\" for the last 10 days in which she saw her PCP who did a influenza and COVID-19 test, both of which came back negative. Associated symptoms for this include fever (up to 103 F), fatigue, body aches, subjective cervical lymphadenopathy, and headache. She notes that her fever has improved. No recent long distance travel; however, she reports that she is in the woods a lot, but denies any known rash or bug bites.    She also denies any abdominal pain, nausea, vomiting, dysuria, cough, runny nose, congestion, sore throat, or dental pain.  p  REVIEW OF SYSTEMS   All systems reviewed, please see HPI for pertinent findings.    PAST MEDICAL HISTORY:  No past medical history on file.    PAST SURGICAL HISTORY:  History reviewed. No pertinent surgical history.    CURRENT MEDICATIONS:    No current facility-administered medications for this encounter.    Current Outpatient Medications:      PRENATAL VIT CALC,IRON,FOLIC (PRENATAL VITAMIN ORAL), [PRENATAL VIT CALC,IRON,FOLIC (PRENATAL VITAMIN ORAL)] Take by mouth., Disp: , Rfl:      sertraline (ZOLOFT) 50 MG tablet, [SERTRALINE (ZOLOFT) 50 MG TABLET] Take 50 mg by mouth daily., Disp: , Rfl:     ALLERGIES:  Allergies   Allergen Reactions     Ciprofloxacin Unknown       FAMILY HISTORY:  Family History   Problem Relation Age of Onset     Hypertension Mother      No Known Problems Father      Anxiety Disorder Sister        SOCIAL HISTORY:   Social History     Socioeconomic History     Marital status:    Tobacco Use     Smoking status: Never     Smokeless tobacco: Never   Substance and Sexual Activity     Alcohol use: No     Drug use: No     Sexual activity: Yes     Partners: Male       PHYSICAL EXAM:    Vitals: /63   Pulse 68   Temp 98.1  F (36.7  C) (Temporal)   Resp 18   Ht 1.676 m (5' 6\")   Wt 64.9 kg (143 lb)   SpO2 100%   BMI 23.08 kg/m     Constitutional: Well developed, well nourished. No acute distress  HENT: Normocephalic, atraumatic, " mucous membranes moist. Neck-gross ROM intact, no nuchal rigidity, no swelling/erythema or palpable masses  Mouth: posteriororopharynx without erythema or exudates, uvula is midline. No muffled voice or trismus.   Eyes: Pupils mid-range, sclera white  Respiratory: CTAB, no respiratory distress, speaks full sentences easily.  Cardiovascular: Mild tachycardic heart rate, regular rhythm. No lower extremity edema  GI: Soft, not distended, not tender to palpation, no palpable masses  Musculoskeletal: Moving all 4 extremities intentionally and without pain. No obvious deformity.  Skin: Warm, dry, no rash.  Neurologic: Alert & oriented, speech clear, CNs grossly intact, no focal deficits noted     LAB:  All pertinent labs reviewed and interpreted.  Labs Ordered and Resulted from Time of ED Arrival to Time of ED Departure   COMPREHENSIVE METABOLIC PANEL - Abnormal       Result Value    Sodium 140      Potassium 3.8      Chloride 104      Carbon Dioxide (CO2) 23      Anion Gap 13      Urea Nitrogen 19.0      Creatinine 0.80      Calcium 9.4      Glucose 119 (*)     Alkaline Phosphatase 79      AST 35      ALT 26      Protein Total 7.0      Albumin 4.0      Bilirubin Total 0.4      GFR Estimate >90     D DIMER QUANTITATIVE - Abnormal    D-Dimer Quantitative 2.56 (*)    MONONUCLEOSIS SCREEN - Abnormal    Mononucleosis Screen Positive (*)    ROUTINE UA WITH MICROSCOPIC REFLEX TO CULTURE - Abnormal    Color Urine Yellow      Appearance Urine Clear      Glucose Urine Negative      Bilirubin Urine Negative      Ketones Urine Negative      Specific Gravity Urine 1.029      Blood Urine 0.2 mg/dL (*)     pH Urine 5.5      Protein Albumin Urine 20 (*)     Urobilinogen Urine <2.0      Nitrite Urine Negative      Leukocyte Esterase Urine Negative      Mucus Urine Present (*)     RBC Urine 36 (*)     WBC Urine 1      Squamous Epithelials Urine <1     CBC WITH PLATELETS AND DIFFERENTIAL - Abnormal    WBC Count 3.4 (*)     RBC Count 4.73       Hemoglobin 12.6      Hematocrit 38.6      MCV 82      MCH 26.6      MCHC 32.6      RDW 13.1      Platelet Count 162      % Neutrophils 55      % Lymphocytes 34      % Monocytes 8      % Eosinophils 2      % Basophils 1      % Immature Granulocytes 0      NRBCs per 100 WBC 0      Absolute Neutrophils 1.9      Absolute Lymphocytes 1.2      Absolute Monocytes 0.3      Absolute Eosinophils 0.1      Absolute Basophils 0.0      Absolute Immature Granulocytes 0.0      Absolute NRBCs 0.0     TROPONIN T, HIGH SENSITIVITY - Normal    Troponin T, High Sensitivity <6     HCG QUALITATIVE PREGNANCY - Normal    hCG Serum Qualitative Negative     INFLUENZA A/B, RSV, & SARS-COV2 PCR - Normal    Influenza A PCR Negative      Influenza B PCR Negative      RSV PCR Negative      SARS CoV2 PCR Negative     LYME DISEASE TOTAL ABS BLD WITH REFLEX TO CONFIRM CLIA       RADIOLOGY:  CT Chest Pulmonary Embolism w Contrast   Final Result   IMPRESSION:   1.  No pulmonary emboli.   2.  No pleural effusion, focal consolidation or evidence of acute pulmonary opacity.   3.  At least mild mild splenomegaly.          EKG:   Performed at: 7/2/2023 at 16:10  Impression: Sinus rhythm, possible left atrial enlargement.   Rate: 81 bpm  Rhythm: Sinus  QRS Interval: 106 ms  QTc Interval: 441 ms  Comparison: None    I have independently reviewed and interpreted the EKG(s) documented above.       I, Moris Wheat, am serving as a scribe to document services personally performed by Dr. Lyndsay Alvarez based on my observation and the provider's statements to me. I, Lyndsay Alvarez MD attest that Moris Wheat is acting in a scribe capacity, has observed my performance of the services and has documented them in accordance with my direction.    Lyndsay Alvarez M.D.  Emergency Medicine  [facility] EMERGENCY DEPARTMENT  [address] [place] [id number]  [phone]  Dept: [phone]     Lyndsay Alvarez MD  07/03/23 0036

## 2023-07-02 NOTE — ED TRIAGE NOTES
Reports feeling faint and palpitations and chest pressure while watching [employer] game. Chest pressure has since resolved, ongoing palpitation.      Triage Assessment     Row Name 07/02/23 [id number]       Triage Assessment (Adult)    Airway WDL WDL       Respiratory WDL    Respiratory WDL WDL       Skin Circulation/Temperature WDL    Skin Circulation/Temperature WDL WDL       Cardiac WDL    Cardiac WDL rhythm;X  palpitation       Peripheral/Neurovascular WDL    Peripheral Neurovascular WDL WDL       Cognitive/Neuro/Behavioral WDL    Cognitive/Neuro/Behavioral WDL WDL

## 2023-07-02 NOTE — DISCHARGE INSTRUCTIONS
You can take 1000 mg of tylenol every 6-8 hours (no more than 4000 mg in 24 hours).  You can take 400 mg of ibuprofen with food every 6-8 hours (no more than 3200 mg in 24 hours).   Drink plenty of fluids  Avoid any contact sports/injuries to the abdomen  Follow up closely with your primary care doctor    Return to the Emergency Room with new worsening chest pain or shortness of breath, unable to keep down foods or fluids or any other worsening symptoms or concerns

## 2023-07-03 LAB
B BURGDOR IGG SERPL QL IA: 0.06 INDEX
B BURGDOR IGG SERPL QL IA: NONREACTIVE
B BURGDOR IGG+IGM SER QL: 0.97
B BURGDOR IGM SERPL QL IA: 2.39 INDEX
B BURGDOR IGM SERPL QL IA: REACTIVE

## 2023-07-04 RX ORDER — DOXYCYCLINE 100 MG/1
100 CAPSULE ORAL 2 TIMES DAILY
Qty: 28 CAPSULE | Refills: 0 | Status: SHIPPED | OUTPATIENT
Start: 2023-07-04 | End: 2023-07-18

## 2023-07-09 ENCOUNTER — HOSPITAL ENCOUNTER (EMERGENCY)
Facility: CLINIC | Age: 38
Discharge: HOME | End: 2023-07-09
Attending: EMERGENCY MEDICINE | Admitting: EMERGENCY MEDICINE
Payer: COMMERCIAL

## 2023-07-09 VITALS
OXYGEN SATURATION: 99 % | DIASTOLIC BLOOD PRESSURE: 68 MMHG | RESPIRATION RATE: 20 BRPM | HEIGHT: 66 IN | TEMPERATURE: 98.2 F | HEART RATE: 102 BPM | BODY MASS INDEX: 22.98 KG/M2 | WEIGHT: 143 LBS | SYSTOLIC BLOOD PRESSURE: 151 MMHG

## 2023-07-09 DIAGNOSIS — I80.8 SUPERFICIAL PHLEBITIS OF ARM: ICD-10-CM

## 2023-07-09 LAB
ATRIAL RATE - MUSE: 81 BPM
DIASTOLIC BLOOD PRESSURE - MUSE: NORMAL MMHG
INTERPRETATION ECG - MUSE: NORMAL
P AXIS - MUSE: 60 DEGREES
PR INTERVAL - MUSE: 150 MS
QRS DURATION - MUSE: 106 MS
QT - MUSE: 380 MS
QTC - MUSE: 441 MS
R AXIS - MUSE: 81 DEGREES
SYSTOLIC BLOOD PRESSURE - MUSE: NORMAL MMHG
T AXIS - MUSE: 41 DEGREES
VENTRICULAR RATE- MUSE: 81 BPM

## 2023-07-09 PROCEDURE — 99282 EMERGENCY DEPT VISIT SF MDM: CPT

## 2023-07-09 NOTE — DISCHARGE INSTRUCTIONS
Use warm or cool compresses to area.  Take ibuprofen 600mg 3 times a day with food.  This should resolve with time.  Return or follow up with primary clinic if arm swells or worsens as we discussed.

## 2023-07-09 NOTE — ED PROVIDER NOTES
Emergency Department Encounter     Evaluation Date & Time:   No admission date for patient encounter.    CHIEF COMPLAINT:  right arm swelling.      Triage Note:Pt here with concern for right arm swelling. States she was treated last week at [facility] with an IV and now is concerned her vein is swollen and tender. Arm itself does not look bigger. Pt distal pulse strong. Pt was treated for lymes and Mono is on Doxycycline now.                    ED COURSE & MEDICAL DECISION MAKING:        Pt was hospitalized last week at [facility] for Lyme and Colbert, had superficial IV catheter in right AC region, developed some inreased discomfort and palpable cord along IV site yesterday.  No real swelling to arm, no pain/swelling or tenderness to proximal arm to suggest DVT.  No erythema/warmth. Exam/history consistent with superficial phlebitis. No indication for US or antibx or labs. Discussed with pt symptomatic cares with compresses, nsaids, monitoring and return precautions for increased arm swelling/pain.    3:15 PM I introduced myself to the patient, obtained patient history, performed a physical exam, and discussed plan for ED workup including potential diagnostic laboratory/imaging studies and interventions.  3:30 PM Patient to be discharged by ED RN.    Medical Decision Making    History:    Supplemental history from: N/A    External Record(s) reviewed: Documented in chart, if applicable.    Work Up:    Chart documentation includes differential considered and any EKGs or imaging independently interpreted by provider, where specified.    In additional to work up documented, I considered the following work up: Documented in chart, if applicable.    External consultation:    Discussion of management with another provider: Documented in chart, if applicable    Complicating factors:    Care impacted by chronic illness: N/A    Care affected by social determinants of health: N/A    Disposition considerations: Discharge. No  recommendations on prescription strength medication(s). See documentation for any additional details.      At the conclusion of the encounter I discussed the results of all the tests and the disposition. The questions were answered. The patient or family acknowledged understanding and was agreeable with the care plan.      MEDICATIONS GIVEN IN THE EMERGENCY DEPARTMENT:  Medications - No data to display    NEW PRESCRIPTIONS STARTED AT TODAY'S ED VISIT:  Discharge Medication List as of 7/9/2023  3:32 PM          HPI   The history is provided by the patient. No  was used.      Christine Stokes is a 38 year old female with no pertinent history who presents to this ED via walk in for evaluation of right arm swelling.    The patient reports she was seen in the ED last week for increased heart rate, chest tightness, and 10 days of fatigue and headache. She was diagnosed with both mononucleosis and Lyme's disease. She was started on a course of doxycycline. Since starting the antibiotics she has felt improved. Today she is presenting after noticing a swollen and tender vein in her right arm. She was concerned about a possible blood clot. She is otherwise feeling her normal self and denies any other complaints at this time.    Chart Review  7/2/23 - ED Visit for palpitations: Presented with 10 days of ongoing symptoms including fever, fatigue, and right-sided neck lymphadenopathy, and 1 day of chest pressure. Troponin negative. CT PE scan negative. UA without signs of infection. Patient felt improved after fluids, heart rate decreased. Patient tested positive for mononucleosis and Lyme's, prescribed course of doxycycline.    REVIEW OF SYSTEMS:  See HPI      Medical History   History reviewed. No pertinent past medical history.    History reviewed. No pertinent surgical history.    Family History   Problem Relation Age of Onset     Hypertension Mother      No Known Problems Father      Anxiety Disorder  "Sister        Social History     Tobacco Use     Smoking status: Never     Smokeless tobacco: Never   Substance Use Topics     Alcohol use: No     Drug use: No       doxycycline hyclate (VIBRAMYCIN) 100 MG capsule  PRENATAL VIT CALC,IRON,FOLIC (PRENATAL VITAMIN ORAL)  sertraline (ZOLOFT) 50 MG tablet        Physical Exam     Vitals:  BP (!) 151/68   Pulse 102   Temp 98.2  F (36.8  C)   Resp 20   Ht 1.676 m (5' 6\")   Wt 64.9 kg (143 lb)   SpO2 99%   BMI 23.08 kg/m      PHYSICAL EXAM:   Physical Exam  Vitals and nursing note reviewed.   Constitutional:       General: She is not in acute distress.     Appearance: Normal appearance.   HENT:      Head: Normocephalic and atraumatic.      Nose: Nose normal.      Mouth/Throat:      Mouth: Mucous membranes are moist.   Eyes:      Extraocular Movements: Extraocular movements intact.   Cardiovascular:      Rate and Rhythm: Normal rate and regular rhythm.      Pulses: Normal pulses.           Radial pulses are 2+ on the right side and 2+ on the left side.        Dorsalis pedis pulses are 2+ on the right side and 2+ on the left side.   Pulmonary:      Effort: Pulmonary effort is normal.   Musculoskeletal:      Comments: Right antecubital region with palpable cord along superficial vein, mildly tender.  No real warmth/erythema and no associated soft tissue edema/tenderness.  No proximal edema.     Skin:     Findings: No rash.   Neurological:      General: No focal deficit present.      Mental Status: She is alert. Mental status is at baseline.      Comments: Fluent speech   Psychiatric:         Mood and Affect: Mood normal.         Behavior: Behavior normal.         Results     LAB:  All pertinent labs reviewed and interpreted  Labs Ordered and Resulted from Time of ED Arrival to Time of ED Departure - No data to display    RADIOLOGY:  No orders to display                ECG:  none    PROCEDURES:  Procedures:  none      FINAL IMPRESSION:    ICD-10-CM    1. Superficial " phlebitis of arm  I80.8           0 minutes of critical care time      I, Omer Mojica, am serving as a scribe to document services personally performed by Dr. Noble Lynne, based on my observations and the provider's statements to me. I, Noble Lynne, DO attest that Omer Mojica is acting in a scribe capacity, has observed my performance of the services and has documented them in accordance with my direction.      Noble Lynne DO  Emergency Medicine  [facility] EMERGENCY ROOM  7/9/2023  3:21 PM        Noble Lynne MD  07/09/23 [id number]

## 2023-10-21 ENCOUNTER — HEALTH MAINTENANCE LETTER (OUTPATIENT)
Age: 38
End: 2023-10-21

## 2024-01-18 ENCOUNTER — LAB REQUISITION (OUTPATIENT)
Dept: LAB | Facility: CLINIC | Age: 39
End: 2024-01-18

## 2024-01-18 DIAGNOSIS — Z01.419 ENCOUNTER FOR GYNECOLOGICAL EXAMINATION (GENERAL) (ROUTINE) WITHOUT ABNORMAL FINDINGS: ICD-10-CM

## 2024-01-18 DIAGNOSIS — R19.4 CHANGE IN BOWEL HABIT: ICD-10-CM

## 2024-01-18 DIAGNOSIS — Z13.220 ENCOUNTER FOR SCREENING FOR LIPOID DISORDERS: ICD-10-CM

## 2024-01-18 PROCEDURE — 82465 ASSAY BLD/SERUM CHOLESTEROL: CPT | Performed by: NURSE PRACTITIONER

## 2024-01-18 PROCEDURE — 80053 COMPREHEN METABOLIC PANEL: CPT | Performed by: NURSE PRACTITIONER

## 2024-01-18 PROCEDURE — 87624 HPV HI-RISK TYP POOLED RSLT: CPT | Performed by: NURSE PRACTITIONER

## 2024-01-18 PROCEDURE — G0145 SCR C/V CYTO,THINLAYER,RESCR: HCPCS | Performed by: NURSE PRACTITIONER

## 2024-01-19 LAB
ALBUMIN SERPL BCG-MCNC: 4.4 G/DL (ref 3.5–5.2)
ALP SERPL-CCNC: 42 U/L (ref 40–150)
ALT SERPL W P-5'-P-CCNC: 11 U/L (ref 0–50)
ANION GAP SERPL CALCULATED.3IONS-SCNC: 13 MMOL/L (ref 7–15)
AST SERPL W P-5'-P-CCNC: 17 U/L (ref 0–45)
BILIRUB SERPL-MCNC: 0.3 MG/DL
BUN SERPL-MCNC: 20.9 MG/DL (ref 6–20)
CALCIUM SERPL-MCNC: 9.4 MG/DL (ref 8.6–10)
CHLORIDE SERPL-SCNC: 101 MMOL/L (ref 98–107)
CHOLEST SERPL-MCNC: 123 MG/DL
CREAT SERPL-MCNC: 0.92 MG/DL (ref 0.51–0.95)
DEPRECATED HCO3 PLAS-SCNC: 26 MMOL/L (ref 22–29)
EGFRCR SERPLBLD CKD-EPI 2021: 81 ML/MIN/1.73M2
FASTING STATUS PATIENT QL REPORTED: NORMAL
GLUCOSE SERPL-MCNC: 105 MG/DL (ref 70–99)
HDLC SERPL-MCNC: 50 MG/DL
LDLC SERPL CALC-MCNC: 63 MG/DL
NONHDLC SERPL-MCNC: 73 MG/DL
POTASSIUM SERPL-SCNC: 3.7 MMOL/L (ref 3.4–5.3)
PROT SERPL-MCNC: 7 G/DL (ref 6.4–8.3)
SODIUM SERPL-SCNC: 140 MMOL/L (ref 135–145)
TRIGL SERPL-MCNC: 49 MG/DL

## 2024-01-22 LAB
BKR LAB AP GYN ADEQUACY: NORMAL
BKR LAB AP GYN INTERPRETATION: NORMAL
BKR LAB AP HPV REFLEX: NORMAL
BKR LAB AP LMP: NORMAL
BKR LAB AP PREVIOUS ABNL DX: NORMAL
BKR LAB AP PREVIOUS ABNORMAL: NORMAL
PATH REPORT.COMMENTS IMP SPEC: NORMAL
PATH REPORT.COMMENTS IMP SPEC: NORMAL
PATH REPORT.RELEVANT HX SPEC: NORMAL

## 2024-01-23 LAB
HUMAN PAPILLOMA VIRUS 16 DNA: NEGATIVE
HUMAN PAPILLOMA VIRUS 18 DNA: NEGATIVE
HUMAN PAPILLOMA VIRUS FINAL DIAGNOSIS: NORMAL
HUMAN PAPILLOMA VIRUS OTHER HR: NEGATIVE

## 2024-04-16 ENCOUNTER — LAB REQUISITION (OUTPATIENT)
Dept: LAB | Facility: CLINIC | Age: 39
End: 2024-04-16

## 2024-04-16 DIAGNOSIS — E61.1 IRON DEFICIENCY: ICD-10-CM

## 2024-04-16 PROCEDURE — 82728 ASSAY OF FERRITIN: CPT | Performed by: PHYSICIAN ASSISTANT

## 2024-04-16 PROCEDURE — 83550 IRON BINDING TEST: CPT | Performed by: PHYSICIAN ASSISTANT

## 2024-04-17 LAB
FERRITIN SERPL-MCNC: 22 NG/ML (ref 6–175)
IRON BINDING CAPACITY (ROCHE): 331 UG/DL (ref 240–430)
IRON SATN MFR SERPL: 12 % (ref 15–46)
IRON SERPL-MCNC: 41 UG/DL (ref 37–145)

## 2024-04-22 ENCOUNTER — LAB REQUISITION (OUTPATIENT)
Dept: LAB | Facility: CLINIC | Age: 39
End: 2024-04-22

## 2024-04-22 DIAGNOSIS — Z11.8 ENCOUNTER FOR SCREENING FOR OTHER INFECTIOUS AND PARASITIC DISEASES: ICD-10-CM

## 2024-04-22 PROCEDURE — 87177 OVA AND PARASITES SMEARS: CPT | Performed by: PHYSICIAN ASSISTANT

## 2024-04-23 LAB — O+P STL MICRO: ABNORMAL

## 2024-07-11 ENCOUNTER — LAB REQUISITION (OUTPATIENT)
Dept: LAB | Facility: CLINIC | Age: 39
End: 2024-07-11

## 2024-07-11 DIAGNOSIS — R53.83 OTHER FATIGUE: ICD-10-CM

## 2024-07-11 DIAGNOSIS — E61.1 IRON DEFICIENCY: ICD-10-CM

## 2024-07-11 LAB
ALBUMIN SERPL BCG-MCNC: 4.8 G/DL (ref 3.5–5.2)
ALP SERPL-CCNC: 41 U/L (ref 40–150)
ALT SERPL W P-5'-P-CCNC: 9 U/L (ref 0–50)
ANION GAP SERPL CALCULATED.3IONS-SCNC: 11 MMOL/L (ref 7–15)
AST SERPL W P-5'-P-CCNC: 11 U/L (ref 0–45)
BILIRUB SERPL-MCNC: 0.6 MG/DL
BUN SERPL-MCNC: 20.1 MG/DL (ref 6–20)
CALCIUM SERPL-MCNC: 9.3 MG/DL (ref 8.6–10)
CHLORIDE SERPL-SCNC: 106 MMOL/L (ref 98–107)
CREAT SERPL-MCNC: 0.89 MG/DL (ref 0.51–0.95)
DEPRECATED HCO3 PLAS-SCNC: 25 MMOL/L (ref 22–29)
EGFRCR SERPLBLD CKD-EPI 2021: 84 ML/MIN/1.73M2
ERYTHROCYTE [DISTWIDTH] IN BLOOD BY AUTOMATED COUNT: 13 % (ref 10–15)
GLUCOSE SERPL-MCNC: 89 MG/DL (ref 70–99)
HCT VFR BLD AUTO: 44 % (ref 35–47)
HGB BLD-MCNC: 14.9 G/DL (ref 11.7–15.7)
IRON BINDING CAPACITY (ROCHE): 323 UG/DL (ref 240–430)
IRON SATN MFR SERPL: 42 % (ref 15–46)
IRON SERPL-MCNC: 137 UG/DL (ref 37–145)
MCH RBC QN AUTO: 28.7 PG (ref 26.5–33)
MCHC RBC AUTO-ENTMCNC: 33.9 G/DL (ref 31.5–36.5)
MCV RBC AUTO: 85 FL (ref 78–100)
PLATELET # BLD AUTO: 173 10E3/UL (ref 150–450)
POTASSIUM SERPL-SCNC: 4.5 MMOL/L (ref 3.4–5.3)
PROT SERPL-MCNC: 7.4 G/DL (ref 6.4–8.3)
RBC # BLD AUTO: 5.2 10E6/UL (ref 3.8–5.2)
SODIUM SERPL-SCNC: 142 MMOL/L (ref 135–145)
TSH SERPL DL<=0.005 MIU/L-ACNC: 2.81 UIU/ML (ref 0.3–4.2)
VIT B12 SERPL-MCNC: 407 PG/ML (ref 232–1245)
VIT D+METAB SERPL-MCNC: 32 NG/ML (ref 20–50)
WBC # BLD AUTO: 5.3 10E3/UL (ref 4–11)

## 2024-07-11 PROCEDURE — 82728 ASSAY OF FERRITIN: CPT | Performed by: NURSE PRACTITIONER

## 2024-07-11 PROCEDURE — 83550 IRON BINDING TEST: CPT | Performed by: NURSE PRACTITIONER

## 2024-07-11 PROCEDURE — 82607 VITAMIN B-12: CPT | Performed by: NURSE PRACTITIONER

## 2024-07-11 PROCEDURE — 82306 VITAMIN D 25 HYDROXY: CPT | Performed by: NURSE PRACTITIONER

## 2024-07-11 PROCEDURE — 84443 ASSAY THYROID STIM HORMONE: CPT | Performed by: NURSE PRACTITIONER

## 2024-07-11 PROCEDURE — 80053 COMPREHEN METABOLIC PANEL: CPT | Performed by: NURSE PRACTITIONER

## 2024-07-11 PROCEDURE — 85027 COMPLETE CBC AUTOMATED: CPT | Performed by: NURSE PRACTITIONER

## 2024-07-12 LAB — FERRITIN SERPL-MCNC: 40 NG/ML (ref 6–175)

## 2024-07-27 ENCOUNTER — HEALTH MAINTENANCE LETTER (OUTPATIENT)
Age: 39
End: 2024-07-27

## 2024-12-14 ENCOUNTER — HEALTH MAINTENANCE LETTER (OUTPATIENT)
Age: 39
End: 2024-12-14